# Patient Record
Sex: MALE
[De-identification: names, ages, dates, MRNs, and addresses within clinical notes are randomized per-mention and may not be internally consistent; named-entity substitution may affect disease eponyms.]

---

## 2024-01-11 ENCOUNTER — APPOINTMENT (OUTPATIENT)
Dept: SURGERY | Facility: CLINIC | Age: 34
End: 2024-01-11
Payer: COMMERCIAL

## 2024-01-11 VITALS
HEART RATE: 65 BPM | OXYGEN SATURATION: 98 % | HEIGHT: 68.11 IN | BODY MASS INDEX: 25.61 KG/M2 | SYSTOLIC BLOOD PRESSURE: 125 MMHG | WEIGHT: 169 LBS | DIASTOLIC BLOOD PRESSURE: 77 MMHG

## 2024-01-11 DIAGNOSIS — Z78.9 OTHER SPECIFIED HEALTH STATUS: ICD-10-CM

## 2024-01-11 PROBLEM — Z00.00 ENCOUNTER FOR PREVENTIVE HEALTH EXAMINATION: Status: ACTIVE | Noted: 2024-01-11

## 2024-01-11 PROCEDURE — 99203 OFFICE O/P NEW LOW 30 MIN: CPT

## 2024-01-11 RX ORDER — LIDOCAINE 5 G/100G
5 OINTMENT TOPICAL
Qty: 1 | Refills: 1 | Status: ACTIVE | COMMUNITY
Start: 2024-01-11 | End: 1900-01-01

## 2024-01-11 NOTE — HISTORY OF PRESENT ILLNESS
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  Patient states he is presently using topical rectal creams/hemorrhoidal creams w lidocaine 3%. He's been having symptoms over the past 5 years, which had healed some time after. 2 years ago, he had been experiencing a lot of constipation and c/o hemorrhoids w surgical excision of hemorrhoidal tag, in the urgent care/[facility].   Patient notices pain and discomfort, which is recurrent, every time he has hard stool /straining w BM's. He is presently on PO stool softeners and prune juice, which helps. He admits to BM's/daily.  As per patient, most recent colonoscopy was done approx 8-9 months ago.

## 2024-01-11 NOTE — ASSESSMENT
[FreeTextEntry1] : IMP: 34 yo M here for initial consult, w cc of having a lot of pain to the anal area, w history of constipation /straining, found to have posterior anal fissure.

## 2024-01-11 NOTE — PLAN
[FreeTextEntry1] : Nifedipine /Lidocaine Ointment prescribed; patient instructed to apply the cream to affected area ;  Avoid constipation /straining; PO stool softeners /continue w prune juice  Sitz baths with warm water   RTO if symptoms don't improve   Patient's questions and concerns addressed to their satisfaction, and patient verbalized an understanding of the information discussed.

## 2024-01-11 NOTE — PHYSICAL EXAM
[Normal rectal exam] : was normal [Normal] : was normal [Posterior] : present posteriorly [None] : there was no rectal mass  [No Rash or Lesion] : No rash or lesion [Alert] : alert [Oriented to Person] : oriented to person [Oriented to Place] : oriented to place [Oriented to Time] : oriented to time [Calm] : calm [de-identified] : A/Ox3; NAD. appears comfortable [de-identified] : EOMI; sclera anicteric. [de-identified] : no cervical lymphadenopathy  [de-identified] : airway patent, no use of accessory muscles [de-identified] : Abd is soft, nondistended, no rebound or guarding. No abdominal masses. No abdominal tenderness. [de-identified] : +ROM, normal gait

## 2024-03-21 ENCOUNTER — APPOINTMENT (OUTPATIENT)
Dept: SURGERY | Facility: CLINIC | Age: 34
End: 2024-03-21

## 2024-04-11 ENCOUNTER — APPOINTMENT (OUTPATIENT)
Dept: SURGERY | Facility: CLINIC | Age: 34
End: 2024-04-11
Payer: MEDICAID

## 2024-04-11 VITALS
HEART RATE: 96 BPM | HEIGHT: 68.11 IN | WEIGHT: 169 LBS | BODY MASS INDEX: 25.61 KG/M2 | DIASTOLIC BLOOD PRESSURE: 78 MMHG | SYSTOLIC BLOOD PRESSURE: 121 MMHG

## 2024-04-11 DIAGNOSIS — K60.2 ANAL FISSURE, UNSPECIFIED: ICD-10-CM

## 2024-04-11 PROCEDURE — 99213 OFFICE O/P EST LOW 20 MIN: CPT

## 2024-04-11 RX ORDER — LIDOCAINE 5 G/100G
5 OINTMENT TOPICAL
Qty: 1 | Refills: 1 | Status: ACTIVE | COMMUNITY
Start: 2024-04-11 | End: 1900-01-01

## 2024-04-11 NOTE — PHYSICAL EXAM
[Normal rectal exam] : was normal [Normal] : was normal [Posterior] : present posteriorly [None] : there was no rectal mass  [No Rash or Lesion] : No rash or lesion [Alert] : alert [Oriented to Person] : oriented to person [Oriented to Place] : oriented to place [Oriented to Time] : oriented to time [Calm] : calm [de-identified] : A/Ox3; NAD. appears comfortable [de-identified] : EOMI; sclera anicteric. [de-identified] : no cervical lymphadenopathy  [de-identified] : airway patent, no use of accessory muscles [de-identified] : Abd is soft, nondistended, no rebound or guarding. No abdominal masses. No abdominal tenderness. [de-identified] : has a lot of tenderness on exam  [de-identified] : +ROM, normal gait

## 2024-04-11 NOTE — PLAN
[FreeTextEntry1] : OR/plan for EUA w sphincterotomy discussed however, patient wants to go ahead w conservative management again at this time recommended sitz baths  continue w stool softener prn to avoid constipation/straining   RTO prn Patient's questions and concerns addressed to their satisfaction, and patient verbalized an understanding of the information discussed.

## 2024-04-11 NOTE — ASSESSMENT
[FreeTextEntry1] : IMP: 32 yo M here for initial consult, w cc of having a lot of pain to the anal area, w history of constipation /straining, found to have posterior anal fissure.

## 2024-04-11 NOTE — HISTORY OF PRESENT ILLNESS
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   Patient notices pain and discomfort, which is recurrent, every time he has hard stool /straining w BM's. He is presently on PO stool softeners and prune juice, which helps. He admits to BM's/daily.  As per patient, most recent colonoscopy was done >1 year ago.  The prescribed cream he had been using earlier this year, helped. However, he still notes some discomfort which comes and goes.

## 2024-08-26 ENCOUNTER — APPOINTMENT (OUTPATIENT)
Dept: SURGERY | Facility: CLINIC | Age: 34
End: 2024-08-26
Payer: COMMERCIAL

## 2024-08-26 VITALS
DIASTOLIC BLOOD PRESSURE: 67 MMHG | BODY MASS INDEX: 24.86 KG/M2 | SYSTOLIC BLOOD PRESSURE: 107 MMHG | HEART RATE: 66 BPM | HEIGHT: 68.11 IN | WEIGHT: 164 LBS

## 2024-08-26 DIAGNOSIS — K60.3 ANAL FISTULA: ICD-10-CM

## 2024-08-26 DIAGNOSIS — Z00.00 ENCOUNTER FOR GENERAL ADULT MEDICAL EXAMINATION W/OUT ABNORMAL FINDINGS: ICD-10-CM

## 2024-08-26 PROCEDURE — 99213 OFFICE O/P EST LOW 20 MIN: CPT

## 2024-08-26 NOTE — PHYSICAL EXAM
[Normal rectal exam] : was normal [Normal] : was normal [Posterior] : present posteriorly [None] : there was no rectal mass  [No Rash or Lesion] : No rash or lesion [Alert] : alert [Oriented to Person] : oriented to person [Oriented to Place] : oriented to place [Oriented to Time] : oriented to time [Calm] : calm [de-identified] : A/Ox3; NAD. appears comfortable [de-identified] : EOMI; sclera anicteric. [de-identified] : no cervical lymphadenopathy  [de-identified] : airway patent, no use of accessory muscles [de-identified] : Abd is soft, nondistended, no rebound or guarding. No abdominal masses. No abdominal tenderness. [de-identified] : anal fistula w tenderness on exam  [de-identified] : +ROM, normal gait

## 2024-08-26 NOTE — HISTORY OF PRESENT ILLNESS
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  Patient notices pain and discomfort, which is recurrent, every time he has hard stool /straining w BM's. He is presently on PO stool softeners and prune juice, which helps. He admits to BM's/daily.  As per patient, most recent colonoscopy was done >1 year ago.   Pt states his symptoms have not resolved and he is continuing to experience a lot of pain and bleeding from opening, w discharge.

## 2024-08-26 NOTE — PHYSICAL EXAM
[Normal rectal exam] : was normal [Normal] : was normal [Posterior] : present posteriorly [None] : there was no rectal mass  [No Rash or Lesion] : No rash or lesion [Alert] : alert [Oriented to Person] : oriented to person [Oriented to Place] : oriented to place [Oriented to Time] : oriented to time [Calm] : calm [de-identified] : A/Ox3; NAD. appears comfortable [de-identified] : EOMI; sclera anicteric. [de-identified] : no cervical lymphadenopathy  [de-identified] : airway patent, no use of accessory muscles [de-identified] : Abd is soft, nondistended, no rebound or guarding. No abdominal masses. No abdominal tenderness. [de-identified] : anal fistula w tenderness on exam  [de-identified] : +ROM, normal gait

## 2024-08-26 NOTE — HISTORY OF PRESENT ILLNESS
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  Patient notices pain and discomfort, which is recurrent, every time he has hard stool /straining w BM's. He is presently on PO stool softeners and prune juice, which helps. He admits to BM's/daily.  As per patient, most recent colonoscopy was done >1 year ago.   Pt states his symptoms have not resolved and he is continuing to experience a lot of pain and bleeding from opening, w discharge.

## 2024-08-26 NOTE — PLAN
[FreeTextEntry1] : Patient with symptomatic anal fistula. Has pain and drainage from the area. Had a long d/w the pt. All the options, benefits and risks of the surgery were discussed.   Discussed with the patient the anatomy of the area, the pathophysiology of the disease process and the rationale for surgery. The small potential for anal leakage, recurrence, bleeding and other related complications were discussed.  The attendant risks, possible complications and expected postoperative course have been discussed in detail. I have given the patient the opportunity to ask questions, and all questions have been answered to the patient's satisfaction, and patient wishes to proceed with the planned procedure. Will schedule at [facility] at  for Examination under anaesthesia, anal fistulotomy

## 2024-08-26 NOTE — PLAN
[FreeTextEntry1] : Patient with symptomatic anal fistula. Has pain and drainage from the area. Had a long d/w the pt. All the options, benefits and risks of the surgery were discussed.   Discussed with the patient the anatomy of the area, the pathophysiology of the disease process and the rationale for surgery. The small potential for anal leakage, recurrence, bleeding and other related complications were discussed.  The attendant risks, possible complications and expected postoperative course have been discussed in detail. I have given the patient the opportunity to ask questions, and all questions have been answered to the patient's satisfaction, and patient wishes to proceed with the planned procedure. Will schedule at [facility] at  for Examination under anaesthesia, anal fistulotomy

## 2024-08-27 LAB
ALBUMIN SERPL ELPH-MCNC: 4.5 G/DL
ALP BLD-CCNC: 74 U/L
ALT SERPL-CCNC: 21 U/L
ANION GAP SERPL CALC-SCNC: 15 MMOL/L
AST SERPL-CCNC: 22 U/L
BASOPHILS # BLD AUTO: 0.04 K/UL
BASOPHILS NFR BLD AUTO: 0.6 %
BILIRUB SERPL-MCNC: 1.1 MG/DL
BUN SERPL-MCNC: 10 MG/DL
CALCIUM SERPL-MCNC: 9.5 MG/DL
CHLORIDE SERPL-SCNC: 104 MMOL/L
CO2 SERPL-SCNC: 25 MMOL/L
CREAT SERPL-MCNC: 0.96 MG/DL
EGFR: 107 ML/MIN/1.73M2
EOSINOPHIL # BLD AUTO: 0.06 K/UL
EOSINOPHIL NFR BLD AUTO: 0.8 %
GLUCOSE SERPL-MCNC: 66 MG/DL
HCT VFR BLD CALC: 45.9 %
HGB BLD-MCNC: 14.8 G/DL
IMM GRANULOCYTES NFR BLD AUTO: 0.3 %
LYMPHOCYTES # BLD AUTO: 1.89 K/UL
LYMPHOCYTES NFR BLD AUTO: 26.4 %
MAN DIFF?: NORMAL
MCHC RBC-ENTMCNC: 29 PG
MCHC RBC-ENTMCNC: 32.2 GM/DL
MCV RBC AUTO: 90 FL
MONOCYTES # BLD AUTO: 0.53 K/UL
MONOCYTES NFR BLD AUTO: 7.4 %
NEUTROPHILS # BLD AUTO: 4.63 K/UL
NEUTROPHILS NFR BLD AUTO: 64.5 %
PLATELET # BLD AUTO: 226 K/UL
POTASSIUM SERPL-SCNC: 4.3 MMOL/L
PROT SERPL-MCNC: 7.1 G/DL
RBC # BLD: 5.1 M/UL
RBC # FLD: 13 %
SODIUM SERPL-SCNC: 143 MMOL/L
WBC # FLD AUTO: 7.17 K/UL

## 2024-08-28 NOTE — PRE PROCEDURE NOTE - PRE PROCEDURE EVALUATION
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   Patient notices pain and discomfort, which is recurrent, every time he has hard stool /straining w BM's. He is presently on PO stool softeners and prune juice, which helps. He admits to BM's/daily.   As per patient, most recent colonoscopy was done >1 year ago.     Pt states his symptoms have not resolved and he is continuing to experience a lot of pain and bleeding from opening, w discharge.     Active Problems   1. Anal fissure (565.0) (K60.2)   2. Anal fistula (565.1) (K60.3)          Current Meds   1. Lidocaine 5 % External Ointment; compound with Nifedpine Ointment 0.3% Apply to   affected area BID x 2 weeks;   Therapy: [id number] to (Last Rx:[id number]) Requested for: [id number] Ordered   2. Lidocaine 5 % External Ointment; compound with Nifedpine Ointment 0.3% Apply to   affected area BID x 2 weeks;   Therapy: [id number] to (Last Rx:[id number]) Requested for: [id number] Ordered          Allergies   1. No Known Drug Allergies          Review of Systems    Complete-Male:    Gastrointestinal:. pain to anal area.   Constitutional, Eyes, ENT, Cardiovascular, Respiratory, Genitourinary, Musculoskeletal, Integumentary, Neurological, Psychiatric, Endocrine and Heme/Lymph are otherwise negative.     Vitals  Vital Signs   Recorded: 2024 03:28PM   Systolic: 107  Diastolic: 67  Height: 5 ft 8.11 in  Weight: 164 lb   BMI Calculated: 24.86 kg/m2  BSA Calculated: 1.88 m2  Heart Rate: 66        Physical Exam       General Appearance: A/Ox3; NAD. appears comfortable.   HEENT: EOMI; sclera anicteric.   Neck: no cervical lymphadenopathy.   Respiratory: airway patent, no use of accessory muscles.   Gastrointestinal:. Abd is soft, nondistended, no rebound or guarding. No abdominal masses. No abdominal tenderness.   Rectal:. anal fistula w tenderness on exam. Rectal Exam:: Examination of the rectum was normal. There was an anal fissure noted posteriorly. The sphincter tone was normal. There was rectal tenderness present posteriorly. There was no rectal abscess. There was no rectal mass.   Musculoskeletal: +ROM, normal gait.   Skin:. no rash or lesion.   Neurologic: alert, oriented to person, oriented to place and oriented to time.   Psychiatric: calm.     Pre-Surgical Assessment       Recommended surgery or procedure: EUA, anal fistulotomy   Recommended PST type based on risk: PST Chart Review (low patient risk, very low procedural risk)     Functional Capacity Assessment a?" Metabolic Equivalents (METs)   Patient endorses the ability to: take care of themselves, eat, dress, or use the toilet, walk indoors around the house, walk 100 meters on level ground at 3 to 5 km per hour, do light work around the house like dusting or washing dishes, participate in strenuous sports like swimming, singles tennis, football, basketball, or skiing, participate in moderate recreational activities like golf, bowling, or dancing, do heavy work around the house like scrubbing floors or lifting or moving heavy furniture, run a short distance and climb two flights of stairs or walk up a hill        Surgery or procedure potential timeframe: 2 weeks   Anesthesia History: Previous Reactions to Anesthesia: none   STOP BANG Assessment: birth sex is male, but does not snore loudly, not tired, fatigued, or sleepy during the daytime, not observed to have stopped during sleep, no hypertension, not treated for high blood pressure, BMI less than 35 kG/m2, not over 50 years of age and neck circumference less than 16 inches   STOP BANG Score: 0 - 2: Low Risk   Previous blood transfusion: no   External/internal devices being used in treatment of patient: No devices in use in treatment   Reproductive, Female - Is patient pregnant? not applicable     Assessment   1. Anal fistula (565.1) (K60.3)   2. Encounter for preventive health examination (V70.0) (Z00.00)  Narrative:   IMP: 34 yo M found to have anal fistula on exam.           Plan   1. Surgery Booking Form Outpatient . Status: Need Information - Required information    Requested for: [id number]  Did the patient receive COVID 19 vaccine? : No  ERP (Enhanced Recovery Program) : No  Flouro : N  X-Ray : N  MRI : N  AICD : N  Pacer : N  Diabetic : N  Latex Allergy : N  Frozen Section : N  Biopsy Done : N  Medical Evaluation : No  PST Triage Evaluation : Surgeon H&P  Admission Type : OP  Anesthesia Alert : N  Anesthesia Type : General  Laterality : N/A  Length of Procedure : 1 hour  Procedure Description: : examination under anesthesia, anal fistulotomy  CPT Codes : 97857,77929  Weight : 164lbs  Height : 5'8  Date of Surgery and Time : 2024  Policy # : [id number]   2. CBC with Auto Diff; Status:Active; Requested for:2024;    3. Comprehensive Metabolic Panel; Status:Active; Requested for:2024;   Narrative: Patient with symptomatic anal fistula. Has pain and drainage from the area. Had a long d/w the pt. All the options, benefits and risks of the surgery were discussed.     Discussed with the patient the anatomy of the area, the pathophysiology of the disease process and the rationale for surgery. The small potential for anal leakage, recurrence, bleeding and other related complications were discussed.   The attendant risks, possible complications and expected postoperative course have been discussed in detail. I have given the patient the opportunity to ask questions, and all questions have been answered to the patient's satisfaction, and patient wishes to proceed with the planned procedure.  Will schedule at [facility] at  for Examination under anaesthesia, anal fistulotomy.           Signatures     Electronically signed by : OPAL MON MD; Aug 26 2024 5:28PM Eastern Standard Time (Author)

## 2024-08-28 NOTE — PRE PROCEDURE NOTE - NSPROCASSESMENT_GEN_ALL_CORE
History and Physical referenced above reviewed by pre-surgical testing and anaesthesia compiled here for day of surgery review and attestation by surgical team.

## 2024-08-29 ENCOUNTER — TRANSCRIPTION ENCOUNTER (OUTPATIENT)
Age: 34
End: 2024-08-29

## 2024-08-30 ENCOUNTER — TRANSCRIPTION ENCOUNTER (OUTPATIENT)
Age: 34
End: 2024-08-30

## 2024-08-30 ENCOUNTER — APPOINTMENT (OUTPATIENT)
Dept: SURGERY | Facility: HOSPITAL | Age: 34
End: 2024-08-30

## 2024-08-30 ENCOUNTER — OUTPATIENT (OUTPATIENT)
Dept: OUTPATIENT SERVICES | Facility: HOSPITAL | Age: 34
LOS: 1 days | End: 2024-08-30
Payer: COMMERCIAL

## 2024-08-30 VITALS
SYSTOLIC BLOOD PRESSURE: 111 MMHG | TEMPERATURE: 98 F | OXYGEN SATURATION: 99 % | DIASTOLIC BLOOD PRESSURE: 74 MMHG | HEART RATE: 51 BPM | RESPIRATION RATE: 16 BRPM

## 2024-08-30 VITALS
OXYGEN SATURATION: 100 % | WEIGHT: 164.02 LBS | HEART RATE: 55 BPM | TEMPERATURE: 98 F | DIASTOLIC BLOOD PRESSURE: 71 MMHG | HEIGHT: 68 IN | RESPIRATION RATE: 16 BRPM | SYSTOLIC BLOOD PRESSURE: 109 MMHG

## 2024-08-30 DIAGNOSIS — K60.3 ANAL FISTULA: ICD-10-CM

## 2024-08-30 PROCEDURE — 46270 REMOVE ANAL FIST SUBQ: CPT

## 2024-08-30 PROCEDURE — 88304 TISSUE EXAM BY PATHOLOGIST: CPT | Mod: 26

## 2024-08-30 PROCEDURE — 46200 REMOVAL OF ANAL FISSURE: CPT

## 2024-08-30 PROCEDURE — 46275 REMOVE ANAL FIST INTER: CPT

## 2024-08-30 PROCEDURE — 88304 TISSUE EXAM BY PATHOLOGIST: CPT

## 2024-08-30 RX ORDER — OXYCODONE HYDROCHLORIDE 5 MG/1
1 TABLET ORAL
Qty: 42 | Refills: 0
Start: 2024-08-30 | End: 2024-09-05

## 2024-08-30 RX ORDER — SODIUM CHLORIDE 9 MG/ML
3 INJECTION INTRAMUSCULAR; INTRAVENOUS; SUBCUTANEOUS EVERY 8 HOURS
Refills: 0 | Status: DISCONTINUED | OUTPATIENT
Start: 2024-08-30 | End: 2024-08-30

## 2024-08-30 RX ORDER — OXYCODONE HYDROCHLORIDE 5 MG/1
5 TABLET ORAL ONCE
Refills: 0 | Status: DISCONTINUED | OUTPATIENT
Start: 2024-08-30 | End: 2024-08-30

## 2024-08-30 RX ORDER — SENNA 187 MG
1 TABLET ORAL
Qty: 14 | Refills: 0
Start: 2024-08-30 | End: 2024-09-12

## 2024-08-30 RX ORDER — HYDROMORPHONE HYDROCHLORIDE 2 MG/1
0.5 TABLET ORAL
Refills: 0 | Status: DISCONTINUED | OUTPATIENT
Start: 2024-08-30 | End: 2024-08-30

## 2024-08-30 RX ORDER — OXYCODONE HYDROCHLORIDE 5 MG/1
1 TABLET ORAL
Qty: 12 | Refills: 0
Start: 2024-08-30 | End: 2024-09-01

## 2024-08-30 RX ORDER — POLYETHYLENE GLYCOL 3350 17 G/17G
17 POWDER, FOR SOLUTION ORAL
Qty: 1 | Refills: 0
Start: 2024-08-30 | End: 2024-09-12

## 2024-08-30 NOTE — ASU DISCHARGE PLAN (ADULT/PEDIATRIC) - ASU DC SPECIAL INSTRUCTIONSFT
You had an anal fissure and fistula: these were both excised.     Wound care:   - You can shower: let the soap and water run down and pat dry.   - Do sitz baths 3x per day or after every bowel movement to keep the area clean   - You can remove the dressing tomorrow, including the strip of surgicell that was left in the wound    Activity:   - You should walk around as much as possible to help speed your recovery and prevent blood clots  - You can resume your regular activities as you feel ready to      Pain control:  - For pain, alternate tylenol 650 mg and motrin 400 mg every 3 hours for baseline pain control. This means that if you take tylenol at 12 pm, take motrin at 3 pm, and then tylenol again at 6 pm, etc. Do not take more than 4,000 mg of tylenol in 24 hours. Do not take more than 3,200 mg of motrin in 24 hours. Do not take motrin consistently for > 7 days as this medication can increase your risk of stomach ulceration and GI bleeding when taken for prolonged periods of time. For pain not controlled by these medications, take the prescription sent to your pharmacy     Diet:   - You can eat a regular diet    Stool softeners were sent to your pharmacy to help you have soft, regular bowel movements

## 2024-08-30 NOTE — BRIEF OPERATIVE NOTE - NSICDXBRIEFPOSTOP_GEN_ALL_CORE_FT
POST-OP DIAGNOSIS:  Anal fissure 30-Aug-2024 11:51:02  Radha Lindsey  Anal fistula 30-Aug-2024 11:51:12  Radha Lindsey

## 2024-08-30 NOTE — BRIEF OPERATIVE NOTE - NSICDXBRIEFPREOP_GEN_ALL_CORE_FT
PRE-OP DIAGNOSIS:  Fissure, anal 30-Aug-2024 11:50:44  Radha Lindsey  Anal fistula 30-Aug-2024 11:50:51  Radha Lindsey

## 2024-08-30 NOTE — BRIEF OPERATIVE NOTE - OPERATION/FINDINGS
anal fistula involving skin and minimal subcutaneous tissue, anal fissure  Fissurectomy and fistulotomy performed

## 2024-08-30 NOTE — ASU DISCHARGE PLAN (ADULT/PEDIATRIC) - NS MD DC FALL RISK RISK
For information on Fall & Injury Prevention, visit: [email or web address] OR  [email or web address] OR  https://www.cdc.gov/steadi/patient.html

## 2024-08-30 NOTE — BRIEF OPERATIVE NOTE - NSICDXBRIEFPROCEDURE_GEN_ALL_CORE_FT
PROCEDURES:  Perianal fistulotomy 30-Aug-2024 11:50:12  Radha Lindsey  Fissurectomy, anal, with sphincterotomy if indicated 30-Aug-2024 11:50:29  Radha Lindsey

## 2024-08-30 NOTE — ASU DISCHARGE PLAN (ADULT/PEDIATRIC) - CARE PROVIDER_API CALL
Marco Antonio Garibay.  Surgery  [address]  [place] [address]  Phone: [phone]  Fax: [phone]  Follow Up Time: 2 weeks

## 2024-09-04 ENCOUNTER — NON-APPOINTMENT (OUTPATIENT)
Age: 34
End: 2024-09-04

## 2024-09-05 ENCOUNTER — APPOINTMENT (OUTPATIENT)
Dept: SURGERY | Facility: CLINIC | Age: 34
End: 2024-09-05
Payer: COMMERCIAL

## 2024-09-05 PROCEDURE — 99024 POSTOP FOLLOW-UP VISIT: CPT

## 2024-09-05 NOTE — ASSESSMENT
[FreeTextEntry1] : IMP: 32 yo M S/P Examination under anesthesia, anal fistulotomy and fissurectomy 08/30/24.

## 2024-09-05 NOTE — ASSESSMENT
[FreeTextEntry1] : IMP: 34 yo M S/P Examination under anesthesia, anal fistulotomy and fissurectomy 08/30/24.

## 2024-09-05 NOTE — HISTORY OF PRESENT ILLNESS
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  Patient notices pain and discomfort, which is recurrent, every time he has hard stool /straining w BM's. He is presently on PO stool softeners and prune juice, which helps. He admits to BM's/daily.  As per patient, most recent colonoscopy was done >1 year ago.   Pt here for postop, S/P examination under anesthesia, anal fistulotomy and fissurectomy 08/30/24.

## 2024-09-05 NOTE — REASON FOR VISIT
[Post Op: _________] : a [unfilled] post op visit [FreeTextEntry1] : S/P Examination under anesthesia, anal fistulotomy and fissurectomy 08/30/24

## 2024-09-05 NOTE — HISTORY OF PRESENT ILLNESS
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  Patient notices pain and discomfort, which is recurrent, every time he has hard stool /straining w BM's. He is presently on PO stool softeners and prune juice, which helps. He admits to BM's/daily.  As per patient, most recent colonoscopy was done >1 year ago.   Pt here for postop, S/P examination under anesthesia, anal fistulotomy and fissurectomy 08/30/24.

## 2024-09-05 NOTE — PHYSICAL EXAM
[TextEntry] : S/P Examination under anesthesia, anal fistulotomy and fissurectomy 08/30/24 well healing wound, no infection

## 2024-09-09 LAB — SURGICAL PATHOLOGY STUDY: SIGNIFICANT CHANGE UP

## (undated) DEVICE — LAP PAD W RING 18 X 18"

## (undated) DEVICE — VISITEC 4X4

## (undated) DEVICE — ELCTR GROUNDING PAD ADULT COVIDIEN

## (undated) DEVICE — VENODYNE/SCD SLEEVE CALF MEDIUM

## (undated) DEVICE — LUBRICATING JELLY ONESHOT 1.25OZ

## (undated) DEVICE — PACK MINOR NO DRAPE

## (undated) DEVICE — PACKING GAUZE PLAIN 0.5"

## (undated) DEVICE — SUT CHROMIC 2-0 30" V-20

## (undated) DEVICE — DRAPE LIGHT HANDLE COVER (BLUE)

## (undated) DEVICE — DRSG CURITY GAUZE SPONGE 4 X 4" 12-PLY

## (undated) DEVICE — SYR LUER LOK 10CC

## (undated) DEVICE — FOR-ESU VALLEYLAB T7E15008DX: Type: DURABLE MEDICAL EQUIPMENT

## (undated) DEVICE — WARMING BLANKET UPPER ADULT

## (undated) DEVICE — DRSG GAUZE VASELINE PETROLEUM 3 X 18"

## (undated) DEVICE — DRSG COMBINE 5X9"

## (undated) DEVICE — DRSG MASTISOL

## (undated) DEVICE — GLV 7 PROTEXIS (BLUE)

## (undated) DEVICE — NDL HYPO SAFE 25G X 1.5" (ORANGE)

## (undated) DEVICE — GLV 7 PROTEXIS (WHITE)

## (undated) DEVICE — DRAPE LAPAROTOMY W POUCHES